# Patient Record
Sex: FEMALE | Race: WHITE | Employment: UNEMPLOYED | URBAN - METROPOLITAN AREA
[De-identification: names, ages, dates, MRNs, and addresses within clinical notes are randomized per-mention and may not be internally consistent; named-entity substitution may affect disease eponyms.]

---
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
## 2025-07-11 ENCOUNTER — HOSPITAL ENCOUNTER (OUTPATIENT)
Facility: HOSPITAL | Age: 20
Discharge: HOME | End: 2025-07-11
Attending: OBSTETRICS & GYNECOLOGY | Admitting: OBSTETRICS & GYNECOLOGY
Payer: COMMERCIAL

## 2025-07-11 VITALS
HEART RATE: 81 BPM | TEMPERATURE: 98.9 F | BODY MASS INDEX: 32.94 KG/M2 | RESPIRATION RATE: 20 BRPM | SYSTOLIC BLOOD PRESSURE: 109 MMHG | DIASTOLIC BLOOD PRESSURE: 58 MMHG | OXYGEN SATURATION: 98 % | WEIGHT: 179 LBS | HEIGHT: 62 IN

## 2025-07-11 PROBLEM — Z33.1 IUP (INTRAUTERINE PREGNANCY), INCIDENTAL: Status: ACTIVE | Noted: 2025-07-11

## 2025-07-11 PROCEDURE — 59025 FETAL NON-STRESS TEST: CPT

## 2025-07-11 PROCEDURE — 99282 EMERGENCY DEPT VISIT SF MDM: CPT

## 2025-07-11 RX ORDER — ERGOCALCIFEROL 1.25 MG/1
50000 CAPSULE, LIQUID FILLED ORAL WEEKLY
COMMUNITY

## 2025-07-12 NOTE — DISCHARGE INSTRUCTIONS
Pregnancy Precautions: Care Instructions  Overview     There is no sure way to prevent labor before your due date ( labor) or to prevent most other pregnancy problems. But there are things you can do to increase your chances of a healthy pregnancy. Go to your appointments, follow your doctor's advice, and take good care of yourself. Eat healthy foods, and exercise (if your doctor agrees). And make sure to drink plenty of water.  Follow-up care is a key part of your treatment and safety. Be sure to make and go to all appointments, and call your doctor if you are having problems. It's also a good idea to know your test results and keep a list of the medicines you take.  How can you care for yourself at home?  Make sure you go to your prenatal appointments. At each visit, your doctor will check your blood pressure and weight. Your doctor will also listen for a fetal heartbeat and measure the size of the uterus.  Drink plenty of fluids. Dehydration can cause contractions. If you have kidney, heart, or liver disease and have to limit fluids, talk with your doctor before you increase the amount of fluids you drink.  Tell your doctor right away if you notice any symptoms of an infection, such as:  Burning when you urinate.  A frequent need to urinate without being able to pass much urine.  A foul-smelling discharge from your vagina.  Vaginal itching.  Unexplained fever.  Unusual pain or soreness in your uterus or lower belly.  Avoid foods that may be harmful.  Don't eat raw meat, deli meat, raw seafood, or raw eggs.  Avoid soft cheese and unpasteurized dairy, like Brie and blue cheese.  Avoid fish that are high in mercury. These include shark, swordfish, nuzhat mackerel, marlin, orange roughy, and bigeye tuna, as well as tilefish from the [place].  If you smoke or vape, quit or cut back as much as you can. Talk to your doctor if you need help quitting.  If you use alcohol, marijuana, or other drugs, quit

## 2025-07-12 NOTE — PROGRESS NOTES
EFM tracing reviewed with YVROSE Buckley CNM. Orders for discharge received.  Written and verbal DC instructions given to include daily fetal kick counts, pregnancy precautions and home  comfort measures. Verbalizes understanding. All questions answered. Up to get dressed.

## 2025-07-12 NOTE — PROGRESS NOTES
Department of Obstetrics and Gynecology  Labor and Delivery  CNM Triage Note      SUBJECTIVE:  Patient presents with complaints of right sided abdominal pain and pink spotting. Reports being seen in the office a few days ago for spotting and pelvic pain and exam WNL. Reports +FM. Denies any leaking fluid or recent intercourse. Patient states they just moved into a new home today and she has not been drinking as much.    OBJECTIVE    Vitals:  BP (!) 109/58   Pulse 81   Temp 98.9 °F (37.2 °C) (Oral)   Resp 20   Ht 1.575 m (5' 2\")   Wt 81.2 kg (179 lb)   SpO2 98%   BMI 32.74 kg/m²         Cervix:  Deferred      Membranes:  Intact    Fetal heart rate:         Baseline Heart Rate:  150        Accelerations:  present       Decelerations:  absent       Variability:  moderate    Contraction frequency: Occasional        ASSESSMENT & PLAN:      NST reactive and appropriate for gestational age  Reports decrease in cramping after oral hydration   labor precautions given  Patient discharged home      Mireya Buckley, MARK - CNM